# Patient Record
Sex: MALE | Race: WHITE
[De-identification: names, ages, dates, MRNs, and addresses within clinical notes are randomized per-mention and may not be internally consistent; named-entity substitution may affect disease eponyms.]

---

## 2018-06-14 ENCOUNTER — HOSPITAL ENCOUNTER (EMERGENCY)
Dept: HOSPITAL 25 - ED | Age: 48
Discharge: HOME | End: 2018-06-14
Payer: SELF-PAY

## 2018-06-14 VITALS — DIASTOLIC BLOOD PRESSURE: 74 MMHG | SYSTOLIC BLOOD PRESSURE: 118 MMHG

## 2018-06-14 DIAGNOSIS — R53.83: ICD-10-CM

## 2018-06-14 DIAGNOSIS — J02.9: Primary | ICD-10-CM

## 2018-06-14 DIAGNOSIS — F17.210: ICD-10-CM

## 2018-06-14 PROCEDURE — 99282 EMERGENCY DEPT VISIT SF MDM: CPT

## 2018-06-14 PROCEDURE — 87651 STREP A DNA AMP PROBE: CPT

## 2018-06-14 NOTE — ED
Jose Manuel DIAZ Simon, scribed for Emanuel Cehn MD on 06/14/18 at 1122 .





Throat Pain/Nasal Congestion





- HPI Summary


HPI Summary: 





This patient is a 47 year old M presenting to [facility] with a chief complaint of 

sore throat of 4/10 severity since 6/12/18 night/6/13/18 morning. He endorses 

fatigue, and dysphagia, swollen and tingly throat feeling. Pt denies neck 

soreness. Children at home currently have strep.





- History of Current Complaint


Chief Complaint: EDThroatPain


Time Seen by Provider: 06/14/18 10:04


Hx Obtained From: Patient


Onset/Duration: Gradual Onset, Lasting Days, Still Present


Severity: Mild


Associated Signs And Symptoms: Positive: Dysphagia





- Allergies/Home Medications


Allergies/Adverse Reactions: 


 Allergies











Allergy/AdvReac Type Severity Reaction Status Date / Time


 


Penicillins Allergy  Unknown Verified 06/14/18 09:39





   Reaction  





   Details  


 


environmental Allergy  Congestion Uncoded 06/14/18 09:39














PMH/Surg Hx/FS Hx/Imm Hx


Endocrine/Hematology History: 


   Denies: Hx Anticoagulant Therapy, Hx Diabetes, Hx Thyroid Disease


Cardiovascular History: 


   Denies: Hx Hypertension, Hx Pacemaker/ICD


Respiratory History: Reports: Hx Asthma


   Denies: Hx Chronic Obstructive Pulmonary Disease (COPD)


GI History: Reports: Hx Ulcer


 History: 


   Denies: Hx Renal Disease


Sensory History: 


   Denies: Hx Legally Blind, Hx Deafness


Opthamlomology History: 


   Denies: Hx Legally Blind


EENT History: 


   Denies: Hx Deafness


Neurological History: 


   Denies: Hx Dementia, Hx Seizures


Psychiatric History: 


   Denies: Hx Substance Abuse


Infectious Disease History: No


Infectious Disease History: 


   Denies: Hx Hepatitis, Hx Human Immunodeficiency Virus (HIV), Traveled 

Outside the [place] in Last 30 Days





- Family History


Known Family History: Positive: Diabetes, Other - stomach ulcers





- Social History


Alcohol Use: Rare


Substance Use Type: Reports: None


Hx Tobacco Use: Yes


Smoking Status (MU): Current Every Day Smoker





Review of Systems


Positive: Fatigue


Positive: Sore Throat, Other - dysphagia


All Other Systems Reviewed And Are Negative: Yes





Physical Exam





- Summary


Physical Exam Summary: 





Appearance: The patient is well-nourished in no acute distress and in no acute 

pain.


 


Skin: The skin is warm and dry and skin color reflects adequate perfusion.


 


HEENT: The head is normocephalic and atraumatic. The pupils are equal and 

reactive. The conjunctivae are clear and without drainage. Nares are patent and 

without drainage. Mouth reveals moist mucous membranes and the throat shows 

mild erythema, but no exudate. The external ears are intact. The ear canals are 

patent and without drainage. The tympanic membranes are intact. 





Neck: The neck is supple with full range of motion and non-tender. There are no 

carotid bruits. There is no neck vein distension. Mild anterior cervical lymph 

adenopathy.


 


Respiratory: Chest is non-tender. Lungs are clear to auscultation and breath 

sounds are symmetrical and equal.


 


Cardiovascular: Heart is regular rate and rhythm. There is no murmur or rub 

auscultated. There is no peripheral edema and pulses are symmetrical and equal.


 


Abdomen: The abdomen is soft and non-tender. There are normal bowel sounds 

heard in all four quadrants and there is no organomegaly palpated.


 


Musculoskeletal: There is no back tenderness noted. Extremities are non-tender 

with full range of motion. There is good capillary refill. There is no 

peripheral edema or calf tenderness elicited.


 


Neurological: Patient is alert and oriented to person, place and time. The 

patient has symmetrical motor strength in all four extremities. Cranial nerves 

are grossly intact. Deep tendon reflexes are symmetrical and equal in all four 

extremities.


 


Psychiatric: The patient has an appropriate affect and does not exhibit any 

anxiety or depression.


Triage Information Reviewed: Yes


Vital Signs On Initial Exam: 


 Initial Vitals











Temp Pulse Resp BP Pulse Ox


 


 97.5 F   91   16   138/94   99 


 


 06/14/18 09:36  06/14/18 09:36  06/14/18 09:36  06/14/18 09:36  06/14/18 09:36











Vital Signs Reviewed: Yes





Diagnostics





- Vital Signs


 Vital Signs











  Temp Pulse Resp BP Pulse Ox


 


 06/14/18 09:36  97.5 F  91  16  138/94  99














- Laboratory


Lab Results: 


 Lab Results











  06/14/18 Range/Units





  10:37 


 


Group A Strep Rapid  Negative  (Negative)  











Lab Statement: Any lab studies that have been ordered have been reviewed, and 

results considered in the medical decision making process.





Re-Evaluation





- Re-Evaluation


  ** First Eval


Re-Evaluation Time: 11:18


Comment: Discussed Dx and discharge





EENT Course/Dx





- Course


Course Of Treatment: Mr. Soto presents to emergency department with a scratchy 

throat.  He is mostly concerned because his children have tested positive for 

strep.  He had mild erythema to his posterior pharynx and mild anterior 

cervical lymphadenopathy.  An RST was negative and he was discharged for 

symptomatic treatment.





- Diagnoses


Provider Diagnoses: 


 Pharyngitis








Discharge





- Sign-Out/Discharge


Documenting (check all that apply): Discharge/Admit/Transfer - discharge





- Discharge Plan


Condition: Stable


Disposition: HOME


Patient Education Materials:  Pharyngitis (ED)


Referrals: 


No Primary Care Phys,NOPCP [Primary Care Provider] - 


[facility] PHYSICIAN REFERRAL [Outside] - 2 Days


Additional Instructions: 


RETURN TO EMERGENCY DEPARTMENT FOR ANY CHANGING OR WORSENING SYMPTOMS.





- Billing Disposition and Condition


Condition: STABLE


Disposition: Home





The documentation as recorded by the Jose Manuel hillman Simon accurately 

reflects the service I personally performed and the decisions made by me, Emanuel Chen MD.